# Patient Record
Sex: FEMALE | Race: WHITE | NOT HISPANIC OR LATINO | Employment: FULL TIME | URBAN - METROPOLITAN AREA
[De-identification: names, ages, dates, MRNs, and addresses within clinical notes are randomized per-mention and may not be internally consistent; named-entity substitution may affect disease eponyms.]

---

## 2018-08-10 ENCOUNTER — CLINICAL SUPPORT (OUTPATIENT)
Dept: URGENT CARE | Facility: CLINIC | Age: 28
End: 2018-08-10
Payer: MEDICARE

## 2018-08-10 PROCEDURE — 90471 IMMUNIZATION ADMIN: CPT | Mod: S$GLB,,, | Performed by: EMERGENCY MEDICINE

## 2018-08-10 PROCEDURE — 90714 TD VACC NO PRESV 7 YRS+ IM: CPT | Mod: S$GLB,,, | Performed by: EMERGENCY MEDICINE

## 2021-02-08 ENCOUNTER — OFFICE VISIT (OUTPATIENT)
Dept: URGENT CARE | Facility: CLINIC | Age: 31
End: 2021-02-08
Payer: COMMERCIAL

## 2021-02-08 VITALS
RESPIRATION RATE: 16 BRPM | SYSTOLIC BLOOD PRESSURE: 135 MMHG | HEIGHT: 61 IN | BODY MASS INDEX: 26.43 KG/M2 | DIASTOLIC BLOOD PRESSURE: 90 MMHG | WEIGHT: 140 LBS | TEMPERATURE: 98 F | HEART RATE: 85 BPM | OXYGEN SATURATION: 97 %

## 2021-02-08 DIAGNOSIS — R09.81 SINUS CONGESTION: Primary | ICD-10-CM

## 2021-02-08 LAB
CTP QC/QA: YES
SARS-COV-2 RDRP RESP QL NAA+PROBE: NEGATIVE

## 2021-02-08 PROCEDURE — U0002 COVID-19 LAB TEST NON-CDC: HCPCS | Mod: QW,S$GLB,, | Performed by: PHYSICIAN ASSISTANT

## 2021-02-08 PROCEDURE — 3008F BODY MASS INDEX DOCD: CPT | Mod: CPTII,S$GLB,, | Performed by: PHYSICIAN ASSISTANT

## 2021-02-08 PROCEDURE — 99204 OFFICE O/P NEW MOD 45 MIN: CPT | Mod: S$GLB,CS,, | Performed by: PHYSICIAN ASSISTANT

## 2021-02-08 PROCEDURE — 99204 PR OFFICE/OUTPT VISIT, NEW, LEVL IV, 45-59 MIN: ICD-10-PCS | Mod: S$GLB,CS,, | Performed by: PHYSICIAN ASSISTANT

## 2021-02-08 PROCEDURE — U0002: ICD-10-PCS | Mod: QW,S$GLB,, | Performed by: PHYSICIAN ASSISTANT

## 2021-02-08 PROCEDURE — 3008F PR BODY MASS INDEX (BMI) DOCUMENTED: ICD-10-PCS | Mod: CPTII,S$GLB,, | Performed by: PHYSICIAN ASSISTANT

## 2021-04-07 ENCOUNTER — OFFICE VISIT (OUTPATIENT)
Dept: URGENT CARE | Facility: CLINIC | Age: 31
End: 2021-04-07
Payer: COMMERCIAL

## 2021-04-07 VITALS
OXYGEN SATURATION: 98 % | TEMPERATURE: 98 F | WEIGHT: 140 LBS | DIASTOLIC BLOOD PRESSURE: 77 MMHG | HEIGHT: 61 IN | SYSTOLIC BLOOD PRESSURE: 127 MMHG | BODY MASS INDEX: 26.43 KG/M2 | RESPIRATION RATE: 17 BRPM | HEART RATE: 79 BPM

## 2021-04-07 DIAGNOSIS — J30.89 NON-SEASONAL ALLERGIC RHINITIS DUE TO OTHER ALLERGIC TRIGGER: ICD-10-CM

## 2021-04-07 DIAGNOSIS — Z20.828 EXPOSURE TO SARS-ASSOCIATED CORONAVIRUS: ICD-10-CM

## 2021-04-07 DIAGNOSIS — R11.10 VOMITING, INTRACTABILITY OF VOMITING NOT SPECIFIED, PRESENCE OF NAUSEA NOT SPECIFIED, UNSPECIFIED VOMITING TYPE: Primary | ICD-10-CM

## 2021-04-07 LAB
CTP QC/QA: YES
SARS-COV-2 RDRP RESP QL NAA+PROBE: NEGATIVE

## 2021-04-07 PROCEDURE — 3008F PR BODY MASS INDEX (BMI) DOCUMENTED: ICD-10-PCS | Mod: CPTII,S$GLB,, | Performed by: INTERNAL MEDICINE

## 2021-04-07 PROCEDURE — U0002: ICD-10-PCS | Mod: QW,S$GLB,, | Performed by: INTERNAL MEDICINE

## 2021-04-07 PROCEDURE — 99213 OFFICE O/P EST LOW 20 MIN: CPT | Mod: S$GLB,CS,, | Performed by: INTERNAL MEDICINE

## 2021-04-07 PROCEDURE — U0002 COVID-19 LAB TEST NON-CDC: HCPCS | Mod: QW,S$GLB,, | Performed by: INTERNAL MEDICINE

## 2021-04-07 PROCEDURE — 99213 PR OFFICE/OUTPT VISIT, EST, LEVL III, 20-29 MIN: ICD-10-PCS | Mod: S$GLB,CS,, | Performed by: INTERNAL MEDICINE

## 2021-04-07 PROCEDURE — 3008F BODY MASS INDEX DOCD: CPT | Mod: CPTII,S$GLB,, | Performed by: INTERNAL MEDICINE

## 2021-05-12 ENCOUNTER — PATIENT MESSAGE (OUTPATIENT)
Dept: RESEARCH | Facility: HOSPITAL | Age: 31
End: 2021-05-12

## 2021-11-13 ENCOUNTER — CLINICAL SUPPORT (OUTPATIENT)
Dept: URGENT CARE | Facility: CLINIC | Age: 31
End: 2021-11-13
Payer: COMMERCIAL

## 2021-11-13 DIAGNOSIS — Z20.822 ENCOUNTER FOR LABORATORY TESTING FOR COVID-19 VIRUS: ICD-10-CM

## 2021-11-13 LAB — SARS-COV-2 RNA RESP QL NAA+PROBE: NOT DETECTED

## 2021-11-13 PROCEDURE — U0003 INFECTIOUS AGENT DETECTION BY NUCLEIC ACID (DNA OR RNA); SEVERE ACUTE RESPIRATORY SYNDROME CORONAVIRUS 2 (SARS-COV-2) (CORONAVIRUS DISEASE [COVID-19]), AMPLIFIED PROBE TECHNIQUE, MAKING USE OF HIGH THROUGHPUT TECHNOLOGIES AS DESCRIBED BY CMS-2020-01-R: HCPCS | Performed by: EMERGENCY MEDICINE

## 2021-11-13 PROCEDURE — U0005 INFEC AGEN DETEC AMPLI PROBE: HCPCS | Performed by: EMERGENCY MEDICINE

## 2022-01-10 ENCOUNTER — OFFICE VISIT (OUTPATIENT)
Dept: URGENT CARE | Facility: CLINIC | Age: 32
End: 2022-01-10
Payer: COMMERCIAL

## 2022-01-10 VITALS
OXYGEN SATURATION: 99 % | SYSTOLIC BLOOD PRESSURE: 99 MMHG | WEIGHT: 140 LBS | HEIGHT: 61 IN | TEMPERATURE: 99 F | BODY MASS INDEX: 26.43 KG/M2 | RESPIRATION RATE: 16 BRPM | DIASTOLIC BLOOD PRESSURE: 74 MMHG | HEART RATE: 85 BPM

## 2022-01-10 DIAGNOSIS — R05.9 COUGH: ICD-10-CM

## 2022-01-10 DIAGNOSIS — U07.1 COVID-19: Primary | ICD-10-CM

## 2022-01-10 LAB
CTP QC/QA: YES
SARS-COV-2 RDRP RESP QL NAA+PROBE: POSITIVE

## 2022-01-10 PROCEDURE — 3078F PR MOST RECENT DIASTOLIC BLOOD PRESSURE < 80 MM HG: ICD-10-PCS | Mod: CPTII,S$GLB,, | Performed by: PHYSICIAN ASSISTANT

## 2022-01-10 PROCEDURE — 3008F BODY MASS INDEX DOCD: CPT | Mod: CPTII,S$GLB,, | Performed by: PHYSICIAN ASSISTANT

## 2022-01-10 PROCEDURE — 1159F MED LIST DOCD IN RCRD: CPT | Mod: CPTII,S$GLB,, | Performed by: PHYSICIAN ASSISTANT

## 2022-01-10 PROCEDURE — 1160F PR REVIEW ALL MEDS BY PRESCRIBER/CLIN PHARMACIST DOCUMENTED: ICD-10-PCS | Mod: CPTII,S$GLB,, | Performed by: PHYSICIAN ASSISTANT

## 2022-01-10 PROCEDURE — 1160F RVW MEDS BY RX/DR IN RCRD: CPT | Mod: CPTII,S$GLB,, | Performed by: PHYSICIAN ASSISTANT

## 2022-01-10 PROCEDURE — 3074F SYST BP LT 130 MM HG: CPT | Mod: CPTII,S$GLB,, | Performed by: PHYSICIAN ASSISTANT

## 2022-01-10 PROCEDURE — 3008F PR BODY MASS INDEX (BMI) DOCUMENTED: ICD-10-PCS | Mod: CPTII,S$GLB,, | Performed by: PHYSICIAN ASSISTANT

## 2022-01-10 PROCEDURE — U0002: ICD-10-PCS | Mod: QW,CR,S$GLB, | Performed by: PHYSICIAN ASSISTANT

## 2022-01-10 PROCEDURE — 3078F DIAST BP <80 MM HG: CPT | Mod: CPTII,S$GLB,, | Performed by: PHYSICIAN ASSISTANT

## 2022-01-10 PROCEDURE — 99213 OFFICE O/P EST LOW 20 MIN: CPT | Mod: S$GLB,,, | Performed by: PHYSICIAN ASSISTANT

## 2022-01-10 PROCEDURE — 1159F PR MEDICATION LIST DOCUMENTED IN MEDICAL RECORD: ICD-10-PCS | Mod: CPTII,S$GLB,, | Performed by: PHYSICIAN ASSISTANT

## 2022-01-10 PROCEDURE — U0002 COVID-19 LAB TEST NON-CDC: HCPCS | Mod: QW,CR,S$GLB, | Performed by: PHYSICIAN ASSISTANT

## 2022-01-10 PROCEDURE — 3074F PR MOST RECENT SYSTOLIC BLOOD PRESSURE < 130 MM HG: ICD-10-PCS | Mod: CPTII,S$GLB,, | Performed by: PHYSICIAN ASSISTANT

## 2022-01-10 PROCEDURE — 99213 PR OFFICE/OUTPT VISIT, EST, LEVL III, 20-29 MIN: ICD-10-PCS | Mod: S$GLB,,, | Performed by: PHYSICIAN ASSISTANT

## 2022-01-10 NOTE — LETTER
[address] [address], [address] ? [place] [address] ? Phone [phone] ? Fax [phone]           Return to Work/School    Patient: Franchesca Quintana  YOB: 1990   Date: 01/10/2022      To Whom It May Concern:     Franchesca Quintana was in contact with/seen in my office on 01/10/2022. COVID-19 is present in our communities [facility]. Not all patients are eligible or appropriate to be tested. In this situation, your employee meets the following criteria:     Franchesca Quintana has met the criteria for COVID-19 testing and has a POSITIVE result. She can return to work once they are asymptomatic for 24 hours without the use of fever reducing medications AND at least five days from the start of symptoms (or from the first positive result if they have no symptoms).      If you have any questions or concerns, or if I can be of further assistance, please do not hesitate to contact me.     Sincerely,    DARLENE Bright

## 2022-01-10 NOTE — PROGRESS NOTES
"Subjective:       Patient ID: Franchesca Quintana is a 31 y.o. female.    Vitals:  height is 5' 1" (1.549 m) and weight is 63.5 kg (140 lb). Her temperature is 99.4 °F (37.4 °C). Her blood pressure is 99/74 and her pulse is 85. Her respiration is 16 and oxygen saturation is 99%.     Chief Complaint: Sinus Problem    Pt presents with headache, bodyaches, sinus isaiah, post nasal drip, sore throat, chills, fatigue x today.  Pt has not had covid vaccines    Sinus Problem  This is a new problem. The current episode started today. The problem is unchanged. There has been no fever. She is experiencing no pain. Associated symptoms include chills, congestion, coughing, headaches, sinus pressure and a sore throat. Pertinent negatives include no shortness of breath. Treatments tried: ibuprofin. The treatment provided mild relief.       Constitution: Positive for chills. Negative for fever.   HENT: Positive for congestion, sinus pressure and sore throat.    Respiratory: Positive for cough. Negative for shortness of breath.    Neurological: Positive for headaches.       Objective:      Physical Exam   Constitutional: She does not appear ill. No distress.   HENT:   Head: Normocephalic and atraumatic.   Ears:   Right Ear: External ear normal.   Left Ear: External ear normal.   Eyes: Conjunctivae are normal. Right eye exhibits no discharge. Left eye exhibits no discharge.      extraocular movement intact   Cardiovascular: Normal rate, regular rhythm and normal heart sounds.   No murmur heard.  Pulmonary/Chest: Effort normal and breath sounds normal. She has no wheezes. She has no rhonchi. She has no rales.   Abdominal: Normal appearance.   Musculoskeletal: Normal range of motion.         General: Normal range of motion.   Neurological: no focal deficit. She is alert.   Skin: Skin is warm, dry and not pale. jaundice  Psychiatric: Her behavior is normal. Mood, judgment and thought content normal.   Nursing note and vitals reviewed.      " "  Assessment:       1. COVID-19    2. Cough          Plan:         COVID-19    Cough  -     POCT COVID-19 Rapid Screening      Results for orders placed or performed in visit on 01/10/22   POCT COVID-19 Rapid Screening   Result Value Ref Range    POC Rapid COVID Positive (A) Negative     Acceptable Yes         Offered symptomatic treatment, patient declined    Patient Instructions   You have tested POSITIVE for COVID-19 today    Quarantine recommendations based on CDC guidelines:    No symptoms present: You must quarantine for 5 days starting on the day of the positive test.    Symptoms present: You must quarantine for 5 days starting on the day of symptom onset.    AFTER 5 days, if your symptoms have improved and you are fever free, you can return to the community on day 6.    The CDC recommends strict mask use for 5 days following quarantine end.    You cannot test negative to "test out" of quarantine. Per CDC recommendations we do not retest within 90 days of a positive test.    Please refer to CDC website for additional information              "

## 2022-01-10 NOTE — PATIENT INSTRUCTIONS
"You have tested POSITIVE for COVID-19 today    Quarantine recommendations based on CDC guidelines:    No symptoms present: You must quarantine for 5 days starting on the day of the positive test.    Symptoms present: You must quarantine for 5 days starting on the day of symptom onset.    AFTER 5 days, if your symptoms have improved and you are fever free, you can return to the community on day 6.    The CDC recommends strict mask use for 5 days following quarantine end.    You cannot test negative to "test out" of quarantine. Per CDC recommendations we do not retest within 90 days of a positive test.    Please refer to CDC website for additional information  "